# Patient Record
Sex: FEMALE | Race: WHITE | NOT HISPANIC OR LATINO | URBAN - METROPOLITAN AREA
[De-identification: names, ages, dates, MRNs, and addresses within clinical notes are randomized per-mention and may not be internally consistent; named-entity substitution may affect disease eponyms.]

---

## 2018-03-12 ENCOUNTER — OUTPATIENT (OUTPATIENT)
Dept: OUTPATIENT SERVICES | Facility: HOSPITAL | Age: 68
LOS: 1 days | Discharge: HOME | End: 2018-03-12

## 2018-03-12 DIAGNOSIS — Z12.31 ENCOUNTER FOR SCREENING MAMMOGRAM FOR MALIGNANT NEOPLASM OF BREAST: ICD-10-CM

## 2018-03-13 DIAGNOSIS — M89.9 DISORDER OF BONE, UNSPECIFIED: ICD-10-CM

## 2018-03-13 DIAGNOSIS — Z13.820 ENCOUNTER FOR SCREENING FOR OSTEOPOROSIS: ICD-10-CM

## 2018-03-13 DIAGNOSIS — Z78.0 ASYMPTOMATIC MENOPAUSAL STATE: ICD-10-CM

## 2019-03-28 ENCOUNTER — OUTPATIENT (OUTPATIENT)
Dept: OUTPATIENT SERVICES | Facility: HOSPITAL | Age: 69
LOS: 1 days | Discharge: HOME | End: 2019-03-28

## 2019-03-28 DIAGNOSIS — Z12.31 ENCOUNTER FOR SCREENING MAMMOGRAM FOR MALIGNANT NEOPLASM OF BREAST: ICD-10-CM

## 2019-05-08 ENCOUNTER — OUTPATIENT (OUTPATIENT)
Dept: OUTPATIENT SERVICES | Facility: HOSPITAL | Age: 69
LOS: 1 days | Discharge: HOME | End: 2019-05-08

## 2019-05-17 DIAGNOSIS — H40.011 OPEN ANGLE WITH BORDERLINE FINDINGS, LOW RISK, RIGHT EYE: ICD-10-CM

## 2019-05-17 DIAGNOSIS — H40.033 ANATOMICAL NARROW ANGLE, BILATERAL: ICD-10-CM

## 2019-05-17 DIAGNOSIS — H02.881 MEIBOMIAN GLAND DYSFUNCTION RIGHT UPPER EYELID: ICD-10-CM

## 2019-05-17 DIAGNOSIS — H25.13 AGE-RELATED NUCLEAR CATARACT, BILATERAL: ICD-10-CM

## 2019-05-17 DIAGNOSIS — H02.884 MEIBOMIAN GLAND DYSFUNCTION LEFT UPPER EYELID: ICD-10-CM

## 2019-05-17 DIAGNOSIS — H04.123 DRY EYE SYNDROME OF BILATERAL LACRIMAL GLANDS: ICD-10-CM

## 2019-09-09 ENCOUNTER — OUTPATIENT (OUTPATIENT)
Dept: OUTPATIENT SERVICES | Facility: HOSPITAL | Age: 69
LOS: 1 days | Discharge: HOME | End: 2019-09-09
Payer: MEDICARE

## 2019-09-09 PROCEDURE — 92083 EXTENDED VISUAL FIELD XM: CPT | Mod: 26

## 2019-09-24 ENCOUNTER — OUTPATIENT (OUTPATIENT)
Dept: OUTPATIENT SERVICES | Facility: HOSPITAL | Age: 69
LOS: 1 days | Discharge: HOME | End: 2019-09-24
Payer: MEDICARE

## 2019-09-24 PROCEDURE — 92012 INTRM OPH EXAM EST PATIENT: CPT

## 2019-09-24 PROCEDURE — 92132 CPTRZD OPH DX IMG ANT SGM: CPT | Mod: 26

## 2019-09-24 PROCEDURE — 92020 GONIOSCOPY: CPT

## 2020-10-21 ENCOUNTER — APPOINTMENT (OUTPATIENT)
Dept: CARDIOLOGY | Facility: CLINIC | Age: 70
End: 2020-10-21
Payer: MEDICARE

## 2020-10-21 VITALS
BODY MASS INDEX: 36.54 KG/M2 | DIASTOLIC BLOOD PRESSURE: 70 MMHG | HEIGHT: 64 IN | WEIGHT: 214 LBS | SYSTOLIC BLOOD PRESSURE: 128 MMHG

## 2020-10-21 DIAGNOSIS — Z78.9 OTHER SPECIFIED HEALTH STATUS: ICD-10-CM

## 2020-10-21 PROCEDURE — 99203 OFFICE O/P NEW LOW 30 MIN: CPT

## 2020-10-21 PROCEDURE — 93000 ELECTROCARDIOGRAM COMPLETE: CPT

## 2020-10-21 PROCEDURE — 99072 ADDL SUPL MATRL&STAF TM PHE: CPT

## 2020-10-21 NOTE — HISTORY OF PRESENT ILLNESS
[FreeTextEntry1] : 70-yo female with h/o hypothyroidism and hyperlipidemia. Patient has experienced episodes of sudden dyspnea for a few minutes while moving around her house, quickly relieved by rest. Patient has not been walking outside since the beginning of the pandemic. She denies CP.\par \par No prior h/o CAD. Her BP is usually normal. No cough or fever.

## 2020-10-21 NOTE — REVIEW OF SYSTEMS
[see HPI] : see HPI [Negative] : Endocrine [Blurry Vision] : no blurred vision [Seeing Double (Diplopia)] : no diplopia [Chest Pain] : no chest pain [Lower Ext Edema] : no extremity edema [Leg Claudication] : no intermittent leg claudication [Palpitations] : no palpitations [Skin: A Rash] : no rash: [Anxiety] : no anxiety

## 2020-10-21 NOTE — ASSESSMENT
[FreeTextEntry1] : 70-yo female with h/o hyperlipidemia, obesity who c/o episodes of SOB.\par \par Plan:\par Diet, weight loss discussed with patient.\par Will obtain blood work from [facility].\par 2D ECHO.\par May need a nuclear stress test.\par F/u in 2-3 weeks.\par \par David Braswell MD\par

## 2020-10-21 NOTE — PHYSICAL EXAM
[General Appearance - Well Developed] : well developed [Normal Appearance] : normal appearance [Well Groomed] : well groomed [General Appearance - Well Nourished] : well nourished [No Deformities] : no deformities [General Appearance - In No Acute Distress] : no acute distress [Normal Conjunctiva] : the conjunctiva exhibited no abnormalities [Eyelids - No Xanthelasma] : the eyelids demonstrated no xanthelasmas [Heart Rate And Rhythm] : heart rate and rhythm were normal [Heart Sounds] : normal S1 and S2 [Murmurs] : no murmurs present [Arterial Pulses Normal] : the arterial pulses were normal [Edema] : no peripheral edema present [Respiration, Rhythm And Depth] : normal respiratory rhythm and effort [Exaggerated Use Of Accessory Muscles For Inspiration] : no accessory muscle use [Auscultation Breath Sounds / Voice Sounds] : lungs were clear to auscultation bilaterally [Bowel Sounds] : normal bowel sounds [Abdomen Soft] : soft [Abdomen Tenderness] : non-tender [Abdomen Mass (___ Cm)] : no abdominal mass palpated [Cyanosis, Localized] : no localized cyanosis [Skin Color & Pigmentation] : normal skin color and pigmentation [] : no rash [Oriented To Time, Place, And Person] : oriented to person, place, and time [Affect] : the affect was normal [Mood] : the mood was normal

## 2020-12-01 ENCOUNTER — APPOINTMENT (OUTPATIENT)
Dept: CARDIOLOGY | Facility: CLINIC | Age: 70
End: 2020-12-01

## 2020-12-14 ENCOUNTER — APPOINTMENT (OUTPATIENT)
Dept: CARDIOLOGY | Facility: CLINIC | Age: 70
End: 2020-12-14
Payer: MEDICARE

## 2020-12-14 VITALS
DIASTOLIC BLOOD PRESSURE: 80 MMHG | SYSTOLIC BLOOD PRESSURE: 140 MMHG | WEIGHT: 216 LBS | BODY MASS INDEX: 38.27 KG/M2 | HEIGHT: 63 IN

## 2020-12-14 PROCEDURE — 93000 ELECTROCARDIOGRAM COMPLETE: CPT

## 2020-12-14 PROCEDURE — 99214 OFFICE O/P EST MOD 30 MIN: CPT | Mod: 25

## 2020-12-14 PROCEDURE — 99072 ADDL SUPL MATRL&STAF TM PHE: CPT

## 2020-12-14 PROCEDURE — 93306 TTE W/DOPPLER COMPLETE: CPT

## 2020-12-14 RX ORDER — ERGOCALCIFEROL 1.25 MG/1
1.25 MG CAPSULE, LIQUID FILLED ORAL
Qty: 6 | Refills: 0 | Status: ACTIVE | COMMUNITY
Start: 2020-09-23

## 2020-12-14 NOTE — HISTORY OF PRESENT ILLNESS
[FreeTextEntry1] : 70-yo female with h/o hypothyroidism and hyperlipidemia. Patient has experienced episodes of sudden dyspnea for a few minutes while moving around her house, quickly relieved by rest. Patient has not been walking outside since the beginning of the pandemic. She denies CP.\par \par No prior h/o CAD. Her BP has been about 140. She has also noticed mild ankle swelling at the end of the day.\par \par 2D ECHO 12/14/20:\par LVEF 68%, grade 1 diastolic dysfunction\par Thickened MV, mild MR.

## 2020-12-14 NOTE — REVIEW OF SYSTEMS
[Blurry Vision] : no blurred vision [Seeing Double (Diplopia)] : no diplopia [see HPI] : see HPI [Chest Pain] : no chest pain [Lower Ext Edema] : lower extremity edema [Leg Claudication] : no intermittent leg claudication [Palpitations] : no palpitations [Skin: A Rash] : no rash: [Anxiety] : no anxiety [Negative] : Endocrine

## 2020-12-14 NOTE — ASSESSMENT
[FreeTextEntry1] : 70-yo female with h/o hyperlipidemia, obesity who c/o episodes of SOB.\par HTN, intermittent edema.\par Normal LVEF, grade 1 diastolic dysfunction.\par \par Plan:\par Low-salt discussed with patient again.\par Will obtain blood work from [facility].\par Start HCTZ 12.5 mg daily.\par Nuclear stress test (Adenosine).\par F/u in 1 month.\par \par David Braswell MD\par

## 2020-12-30 ENCOUNTER — RESULT REVIEW (OUTPATIENT)
Age: 70
End: 2020-12-30

## 2020-12-30 ENCOUNTER — OUTPATIENT (OUTPATIENT)
Dept: OUTPATIENT SERVICES | Facility: HOSPITAL | Age: 70
LOS: 1 days | Discharge: HOME | End: 2020-12-30
Payer: MEDICARE

## 2020-12-30 DIAGNOSIS — R06.02 SHORTNESS OF BREATH: ICD-10-CM

## 2020-12-30 PROCEDURE — 78452 HT MUSCLE IMAGE SPECT MULT: CPT | Mod: 26

## 2021-01-15 ENCOUNTER — APPOINTMENT (OUTPATIENT)
Dept: CARDIOLOGY | Facility: CLINIC | Age: 71
End: 2021-01-15
Payer: MEDICARE

## 2021-01-15 VITALS
WEIGHT: 217 LBS | HEART RATE: 79 BPM | DIASTOLIC BLOOD PRESSURE: 70 MMHG | BODY MASS INDEX: 38.45 KG/M2 | TEMPERATURE: 98.4 F | SYSTOLIC BLOOD PRESSURE: 100 MMHG | HEIGHT: 63 IN

## 2021-01-15 DIAGNOSIS — Z00.00 ENCOUNTER FOR GENERAL ADULT MEDICAL EXAMINATION W/OUT ABNORMAL FINDINGS: ICD-10-CM

## 2021-01-15 PROCEDURE — 99072 ADDL SUPL MATRL&STAF TM PHE: CPT

## 2021-01-15 PROCEDURE — 99213 OFFICE O/P EST LOW 20 MIN: CPT

## 2021-01-15 PROCEDURE — 93000 ELECTROCARDIOGRAM COMPLETE: CPT

## 2021-01-15 RX ORDER — LEVOTHYROXINE SODIUM 75 UG/1
75 TABLET ORAL
Qty: 90 | Refills: 0 | Status: DISCONTINUED | COMMUNITY
Start: 2020-06-29 | End: 2021-01-15

## 2021-01-17 NOTE — PHYSICAL EXAM
[General Appearance - Well Developed] : well developed [Normal Appearance] : normal appearance [Well Groomed] : well groomed [General Appearance - Well Nourished] : well nourished [No Deformities] : no deformities [General Appearance - In No Acute Distress] : no acute distress [Normal Conjunctiva] : the conjunctiva exhibited no abnormalities [Eyelids - No Xanthelasma] : the eyelids demonstrated no xanthelasmas [Exaggerated Use Of Accessory Muscles For Inspiration] : no accessory muscle use [Respiration, Rhythm And Depth] : normal respiratory rhythm and effort [Auscultation Breath Sounds / Voice Sounds] : lungs were clear to auscultation bilaterally [Heart Rate And Rhythm] : heart rate and rhythm were normal [Heart Sounds] : normal S1 and S2 [Murmurs] : no murmurs present [Arterial Pulses Normal] : the arterial pulses were normal [Edema] : no peripheral edema present [Bowel Sounds] : normal bowel sounds [Abdomen Soft] : soft [Abdomen Tenderness] : non-tender [Abdomen Mass (___ Cm)] : no abdominal mass palpated [Cyanosis, Localized] : no localized cyanosis [] : no rash [Skin Color & Pigmentation] : normal skin color and pigmentation [Oriented To Time, Place, And Person] : oriented to person, place, and time [Affect] : the affect was normal [Mood] : the mood was normal

## 2021-01-17 NOTE — REVIEW OF SYSTEMS
[Blurry Vision] : no blurred vision [Seeing Double (Diplopia)] : no diplopia [see HPI] : see HPI [Chest Pain] : no chest pain [Leg Claudication] : no intermittent leg claudication [Palpitations] : no palpitations [Skin: A Rash] : no rash: [Anxiety] : no anxiety [Negative] : Endocrine

## 2021-01-17 NOTE — ASSESSMENT
[FreeTextEntry1] : 70-yo female with h/o HTN, hyperlipidemia, obesity.\par No evidence of ischemia.\par \par Plan:\par Diet, weight loss discussed with patient again.\par Will obtain blood work from [facility].\par F/u in 2 months.\par \par David Braswell MD\par

## 2021-01-17 NOTE — HISTORY OF PRESENT ILLNESS
[FreeTextEntry1] : 70-yo female with h/o hypothyroidism and hyperlipidemia. Patient has experienced episodes of sudden dyspnea for a few minutes while moving around her house, quickly relieved by rest. Patient has not been walking outside since the beginning of the pandemic. She denies CP.\par \par Her BP and leg edema have improved with HCTZ.\par \par 2D ECHO 12/14/20:\par LVEF 68%, grade 1 diastolic dysfunction\par Thickened MV, mild MR.

## 2021-03-19 ENCOUNTER — RX RENEWAL (OUTPATIENT)
Age: 71
End: 2021-03-19

## 2021-04-01 ENCOUNTER — APPOINTMENT (OUTPATIENT)
Dept: CARDIOLOGY | Facility: CLINIC | Age: 71
End: 2021-04-01
Payer: MEDICARE

## 2021-04-01 VITALS
BODY MASS INDEX: 37.56 KG/M2 | HEIGHT: 63 IN | WEIGHT: 212 LBS | SYSTOLIC BLOOD PRESSURE: 100 MMHG | DIASTOLIC BLOOD PRESSURE: 70 MMHG | HEART RATE: 78 BPM | TEMPERATURE: 98 F

## 2021-04-01 PROCEDURE — 93000 ELECTROCARDIOGRAM COMPLETE: CPT

## 2021-04-01 PROCEDURE — 99072 ADDL SUPL MATRL&STAF TM PHE: CPT

## 2021-04-01 PROCEDURE — 99213 OFFICE O/P EST LOW 20 MIN: CPT

## 2021-04-01 NOTE — PHYSICAL EXAM
[General Appearance - Well Developed] : well developed [Normal Appearance] : normal appearance [Well Groomed] : well groomed [General Appearance - Well Nourished] : well nourished [No Deformities] : no deformities [General Appearance - In No Acute Distress] : no acute distress [Normal Conjunctiva] : the conjunctiva exhibited no abnormalities [Eyelids - No Xanthelasma] : the eyelids demonstrated no xanthelasmas [Respiration, Rhythm And Depth] : normal respiratory rhythm and effort [Exaggerated Use Of Accessory Muscles For Inspiration] : no accessory muscle use [Auscultation Breath Sounds / Voice Sounds] : lungs were clear to auscultation bilaterally [Heart Rate And Rhythm] : heart rate and rhythm were normal [Heart Sounds] : normal S1 and S2 [Murmurs] : no murmurs present [Arterial Pulses Normal] : the arterial pulses were normal [Bowel Sounds] : normal bowel sounds [Abdomen Soft] : soft [Abdomen Tenderness] : non-tender [Abdomen Mass (___ Cm)] : no abdominal mass palpated [Cyanosis, Localized] : no localized cyanosis [Skin Color & Pigmentation] : normal skin color and pigmentation [] : no rash [Oriented To Time, Place, And Person] : oriented to person, place, and time [Affect] : the affect was normal [Mood] : the mood was normal [FreeTextEntry1] : mild ankle edema

## 2021-04-01 NOTE — ASSESSMENT
[FreeTextEntry1] : 70-yo female with h/o HTN, hyperlipidemia, obesity.\par No evidence of ischemia.\par \par Plan:\par Diet, weight loss discussed with patient again.\par Repeat BW prior to next visit.\par F/u in 6 months.\par \par David Braswell MD\par

## 2021-04-01 NOTE — HISTORY OF PRESENT ILLNESS
[FreeTextEntry1] : 70-yo female with h/o HTN, hypothyroidism and hyperlipidemia. Patient still c/o mild NARAYAN. She denies CP. She can walk several blocks.\par \par Her BP and leg edema have improved with HCTZ.\par \par 2D ECHO 12/14/20:\par LVEF 68%, grade 1 diastolic dysfunction\par Thickened MV, mild MR.\par \par GFR 97\par Hb A1c 5.6\par LDL\par HDL 5358\par Triglycerides 75\par TSH 2.5.\par

## 2021-04-01 NOTE — REVIEW OF SYSTEMS
[see HPI] : see HPI [Negative] : Endocrine [Blurry Vision] : no blurred vision [Seeing Double (Diplopia)] : no diplopia [Chest Pain] : no chest pain [Leg Claudication] : no intermittent leg claudication [Palpitations] : no palpitations [Skin: A Rash] : no rash: [Anxiety] : no anxiety

## 2021-08-13 NOTE — PHYSICAL EXAM
[General Appearance - Well Developed] : well developed [Normal Appearance] : normal appearance [Well Groomed] : well groomed [General Appearance - Well Nourished] : well nourished [No Deformities] : no deformities [General Appearance - In No Acute Distress] : no acute distress [Normal Conjunctiva] : the conjunctiva exhibited no abnormalities [Eyelids - No Xanthelasma] : the eyelids demonstrated no xanthelasmas [Respiration, Rhythm And Depth] : normal respiratory rhythm and effort weakness/hemiparesis [Exaggerated Use Of Accessory Muscles For Inspiration] : no accessory muscle use [Auscultation Breath Sounds / Voice Sounds] : lungs were clear to auscultation bilaterally [Heart Rate And Rhythm] : heart rate and rhythm were normal [Heart Sounds] : normal S1 and S2 [Murmurs] : no murmurs present [Arterial Pulses Normal] : the arterial pulses were normal [Edema] : no peripheral edema present [Bowel Sounds] : normal bowel sounds [Abdomen Soft] : soft [Abdomen Tenderness] : non-tender [Abdomen Mass (___ Cm)] : no abdominal mass palpated [Cyanosis, Localized] : no localized cyanosis [Skin Color & Pigmentation] : normal skin color and pigmentation [] : no rash [Oriented To Time, Place, And Person] : oriented to person, place, and time [Affect] : the affect was normal [Mood] : the mood was normal

## 2021-10-07 ENCOUNTER — OUTPATIENT (OUTPATIENT)
Dept: OUTPATIENT SERVICES | Facility: HOSPITAL | Age: 71
LOS: 1 days | Discharge: HOME | End: 2021-10-07
Payer: MEDICARE

## 2021-10-07 DIAGNOSIS — Z12.31 ENCOUNTER FOR SCREENING MAMMOGRAM FOR MALIGNANT NEOPLASM OF BREAST: ICD-10-CM

## 2021-10-07 PROCEDURE — 77067 SCR MAMMO BI INCL CAD: CPT | Mod: 26

## 2021-10-07 PROCEDURE — 77063 BREAST TOMOSYNTHESIS BI: CPT | Mod: 26

## 2021-10-11 DIAGNOSIS — Z13.820 ENCOUNTER FOR SCREENING FOR OSTEOPOROSIS: ICD-10-CM

## 2021-10-11 DIAGNOSIS — Z78.0 ASYMPTOMATIC MENOPAUSAL STATE: ICD-10-CM

## 2021-10-11 DIAGNOSIS — M89.9 DISORDER OF BONE, UNSPECIFIED: ICD-10-CM

## 2021-11-04 ENCOUNTER — APPOINTMENT (OUTPATIENT)
Dept: CARDIOLOGY | Facility: CLINIC | Age: 71
End: 2021-11-04
Payer: MEDICARE

## 2021-11-04 VITALS
SYSTOLIC BLOOD PRESSURE: 135 MMHG | WEIGHT: 215 LBS | TEMPERATURE: 98 F | DIASTOLIC BLOOD PRESSURE: 70 MMHG | BODY MASS INDEX: 38.09 KG/M2 | HEIGHT: 63 IN

## 2021-11-04 DIAGNOSIS — R06.02 SHORTNESS OF BREATH: ICD-10-CM

## 2021-11-04 PROCEDURE — 93000 ELECTROCARDIOGRAM COMPLETE: CPT

## 2021-11-04 PROCEDURE — 99213 OFFICE O/P EST LOW 20 MIN: CPT

## 2021-11-04 NOTE — HISTORY OF PRESENT ILLNESS
[FreeTextEntry1] : 70-yo female with h/o HTN, hypothyroidism and hyperlipidemia. Patient still c/o mild NARAYAN. She denies CP. She can walk several blocks.\par \par Her BP and leg edema have improved with HCTZ.\par \par 2D ECHO 12/14/20:\par LVEF 68%, grade 1 diastolic dysfunction\par Thickened MV, mild MR.\par \par GFR 82\par Hb A1c 5.6\par LDL 65\par HDL 54\par Triglycerides 78\par TSH 2.38.\par

## 2021-11-04 NOTE — ASSESSMENT
[FreeTextEntry1] : 70-yo female with h/o HTN, hyperlipidemia, obesity.\par No evidence of ischemia.\par Benign positional vertigo.\par \par Plan:\par Diet, weight loss discussed with patient again.\par Repeat BW prior to next visit.\par F/u in 6 months.\par \par David Braswell MD\par

## 2021-11-04 NOTE — PHYSICAL EXAM
[Well Developed] : well developed [No Acute Distress] : no acute distress [Obese] : obese [Normal Conjunctiva] : normal conjunctiva [Normal Venous Pressure] : normal venous pressure [No Carotid Bruit] : no carotid bruit [Normal S1, S2] : normal S1, S2 [No Murmur] : no murmur [No Rub] : no rub [S4] : S4 [Clear Lung Fields] : clear lung fields [Good Air Entry] : good air entry [No Respiratory Distress] : no respiratory distress  [Soft] : abdomen soft [Non Tender] : non-tender [Normal Bowel Sounds] : normal bowel sounds [Normal Gait] : normal gait [No Edema] : no edema [No Cyanosis] : no cyanosis [No Clubbing] : no clubbing [Venous varicosities] : venous varicosities [No Rash] : no rash [Moves all extremities] : moves all extremities [No Focal Deficits] : no focal deficits [Normal Speech] : normal speech [Alert and Oriented] : alert and oriented [Normal memory] : normal memory

## 2021-11-04 NOTE — REVIEW OF SYSTEMS
[Dyspnea on exertion] : not dyspnea during exertion [Lower Ext Edema] : no extremity edema [Palpitations] : no palpitations [Orthopnea] : no orthopnea [Syncope] : no syncope [Rash] : no rash [Dizziness] : dizziness [Anxiety] : no anxiety [Negative] : Gastrointestinal

## 2021-11-05 ENCOUNTER — RESULT CHARGE (OUTPATIENT)
Age: 71
End: 2021-11-05

## 2021-11-10 ENCOUNTER — APPOINTMENT (OUTPATIENT)
Dept: CARDIOLOGY | Facility: CLINIC | Age: 71
End: 2021-11-10

## 2022-07-19 ENCOUNTER — RX RENEWAL (OUTPATIENT)
Age: 72
End: 2022-07-19

## 2022-11-16 ENCOUNTER — OUTPATIENT (OUTPATIENT)
Dept: OUTPATIENT SERVICES | Facility: HOSPITAL | Age: 72
LOS: 1 days | Discharge: HOME | End: 2022-11-16

## 2022-11-16 DIAGNOSIS — Z12.31 ENCOUNTER FOR SCREENING MAMMOGRAM FOR MALIGNANT NEOPLASM OF BREAST: ICD-10-CM

## 2022-11-16 PROCEDURE — 77067 SCR MAMMO BI INCL CAD: CPT | Mod: 26

## 2022-11-16 PROCEDURE — 77063 BREAST TOMOSYNTHESIS BI: CPT | Mod: 26

## 2022-11-17 ENCOUNTER — APPOINTMENT (OUTPATIENT)
Dept: CARDIOLOGY | Facility: CLINIC | Age: 72
End: 2022-11-17

## 2022-11-17 ENCOUNTER — RESULT CHARGE (OUTPATIENT)
Age: 72
End: 2022-11-17

## 2022-11-17 VITALS
HEIGHT: 63 IN | DIASTOLIC BLOOD PRESSURE: 72 MMHG | SYSTOLIC BLOOD PRESSURE: 128 MMHG | WEIGHT: 205 LBS | BODY MASS INDEX: 36.32 KG/M2 | HEART RATE: 68 BPM

## 2022-11-17 PROCEDURE — 99213 OFFICE O/P EST LOW 20 MIN: CPT | Mod: 25

## 2022-11-17 PROCEDURE — 93000 ELECTROCARDIOGRAM COMPLETE: CPT

## 2022-11-17 RX ORDER — CHLORHEXIDINE GLUCONATE, 0.12% ORAL RINSE 1.2 MG/ML
0.12 SOLUTION DENTAL
Qty: 473 | Refills: 0 | Status: DISCONTINUED | COMMUNITY
Start: 2022-09-19

## 2022-11-17 RX ORDER — NORTRIPTYLINE HYDROCHLORIDE 10 MG/1
10 CAPSULE ORAL
Qty: 60 | Refills: 0 | Status: DISCONTINUED | COMMUNITY
Start: 2022-06-16

## 2022-11-17 RX ORDER — FLUTICASONE PROPIONATE 50 UG/1
50 SPRAY, METERED NASAL
Qty: 16 | Refills: 0 | Status: DISCONTINUED | COMMUNITY
Start: 2022-10-19

## 2022-11-17 RX ORDER — AMOXICILLIN 500 MG/1
500 CAPSULE ORAL
Qty: 30 | Refills: 0 | Status: DISCONTINUED | COMMUNITY
Start: 2022-09-19

## 2022-11-17 RX ORDER — BUTALBITAL, ACETAMINOPHEN AND CAFFEINE 300; 50; 40 MG/1; MG/1; MG/1
50-300-40 CAPSULE ORAL
Qty: 20 | Refills: 0 | Status: DISCONTINUED | COMMUNITY
Start: 2022-05-24

## 2022-11-17 NOTE — HISTORY OF PRESENT ILLNESS
[FreeTextEntry1] : 70-yo female with h/o HTN, hypothyroidism and hyperlipidemia. \par \par Patient states her NARAYAN has improved, she is able to walk several blocks without stopping. She denies CP. \par Had COVID this past summer. \par \par Took a medication from [place] for vertigo which helped significantly\par \par BP is controlled. \par \par 2D ECHO 12/14/20:\par LVEF 68%, grade 1 diastolic dysfunction\par Thickened MV, mild MR.\par \par

## 2022-11-17 NOTE — REVIEW OF SYSTEMS
[Dizziness] : dizziness [Negative] : Gastrointestinal [SOB] : no shortness of breath [Dyspnea on exertion] : not dyspnea during exertion [Chest Discomfort] : no chest discomfort [Lower Ext Edema] : no extremity edema [Leg Claudication] : no intermittent leg claudication [Palpitations] : no palpitations [Orthopnea] : no orthopnea [Syncope] : no syncope [Rash] : no rash [Anxiety] : no anxiety

## 2023-05-11 ENCOUNTER — APPOINTMENT (OUTPATIENT)
Dept: CARDIOLOGY | Facility: CLINIC | Age: 73
End: 2023-05-11
Payer: MEDICARE

## 2023-05-11 ENCOUNTER — RESULT CHARGE (OUTPATIENT)
Age: 73
End: 2023-05-11

## 2023-05-11 VITALS
WEIGHT: 208 LBS | HEART RATE: 70 BPM | DIASTOLIC BLOOD PRESSURE: 60 MMHG | SYSTOLIC BLOOD PRESSURE: 130 MMHG | BODY MASS INDEX: 36.86 KG/M2 | HEIGHT: 63 IN

## 2023-05-11 DIAGNOSIS — R60.9 EDEMA, UNSPECIFIED: ICD-10-CM

## 2023-05-11 DIAGNOSIS — E78.5 HYPERLIPIDEMIA, UNSPECIFIED: ICD-10-CM

## 2023-05-11 DIAGNOSIS — I10 ESSENTIAL (PRIMARY) HYPERTENSION: ICD-10-CM

## 2023-05-11 PROCEDURE — 93000 ELECTROCARDIOGRAM COMPLETE: CPT

## 2023-05-11 PROCEDURE — 99213 OFFICE O/P EST LOW 20 MIN: CPT | Mod: 25

## 2023-05-11 NOTE — ASSESSMENT
[FreeTextEntry1] : 72-yo female with h/o HTN, hyperlipidemia, obesity.\par No evidence of ischemia.\par Leg edema has improved.\par \par \par Plan:\par Diet, weight loss discussed with patient again.\par Continue treatment.\par F/u in 1 year.\par \par David Braswell MD\par

## 2023-05-11 NOTE — HISTORY OF PRESENT ILLNESS
[FreeTextEntry1] : 73 y/o female with h/o HTN, hypothyroidism and hyperlipidemia. \par \par Patient states her NARAYAN has improved, she is able to walk several blocks without stopping. She denies CP. \par Had COVID this past summer.\par \par BP is controlled. \par \par 2D ECHO 12/14/20:\par LVEF 68%, grade 1 diastolic dysfunction\par Thickened MV, mild MR.\par \par LDL 65\par A1C 5.6.\par

## 2023-11-21 ENCOUNTER — OUTPATIENT (OUTPATIENT)
Dept: OUTPATIENT SERVICES | Facility: HOSPITAL | Age: 73
LOS: 1 days | End: 2023-11-21
Payer: MEDICARE

## 2023-11-21 DIAGNOSIS — Z13.820 ENCOUNTER FOR SCREENING FOR OSTEOPOROSIS: ICD-10-CM

## 2023-11-21 DIAGNOSIS — Z12.31 ENCOUNTER FOR SCREENING MAMMOGRAM FOR MALIGNANT NEOPLASM OF BREAST: ICD-10-CM

## 2023-11-21 PROCEDURE — 77063 BREAST TOMOSYNTHESIS BI: CPT | Mod: 26

## 2023-11-21 PROCEDURE — 77067 SCR MAMMO BI INCL CAD: CPT | Mod: 26

## 2023-11-21 PROCEDURE — 77080 DXA BONE DENSITY AXIAL: CPT

## 2023-11-21 PROCEDURE — 77067 SCR MAMMO BI INCL CAD: CPT

## 2023-11-21 PROCEDURE — 77063 BREAST TOMOSYNTHESIS BI: CPT

## 2023-11-22 DIAGNOSIS — Z13.820 ENCOUNTER FOR SCREENING FOR OSTEOPOROSIS: ICD-10-CM

## 2023-11-22 DIAGNOSIS — Z12.31 ENCOUNTER FOR SCREENING MAMMOGRAM FOR MALIGNANT NEOPLASM OF BREAST: ICD-10-CM

## 2024-03-28 RX ORDER — ROSUVASTATIN CALCIUM 5 MG/1
5 TABLET, FILM COATED ORAL
Qty: 90 | Refills: 1 | Status: ACTIVE | COMMUNITY
Start: 1900-01-01 | End: 1900-01-01

## 2024-03-28 RX ORDER — HYDROCHLOROTHIAZIDE 12.5 MG/1
12.5 TABLET ORAL
Qty: 90 | Refills: 1 | Status: ACTIVE | COMMUNITY
Start: 2020-12-14 | End: 1900-01-01

## 2024-09-12 ENCOUNTER — APPOINTMENT (OUTPATIENT)
Dept: CARDIOLOGY | Facility: CLINIC | Age: 74
End: 2024-09-12
Payer: MEDICARE

## 2024-09-12 VITALS
DIASTOLIC BLOOD PRESSURE: 60 MMHG | WEIGHT: 213 LBS | HEART RATE: 81 BPM | SYSTOLIC BLOOD PRESSURE: 118 MMHG | BODY MASS INDEX: 37.74 KG/M2 | HEIGHT: 63 IN

## 2024-09-12 DIAGNOSIS — E78.5 HYPERLIPIDEMIA, UNSPECIFIED: ICD-10-CM

## 2024-09-12 DIAGNOSIS — I10 ESSENTIAL (PRIMARY) HYPERTENSION: ICD-10-CM

## 2024-09-12 DIAGNOSIS — R60.9 EDEMA, UNSPECIFIED: ICD-10-CM

## 2024-09-12 PROCEDURE — 93000 ELECTROCARDIOGRAM COMPLETE: CPT

## 2024-09-12 PROCEDURE — 99214 OFFICE O/P EST MOD 30 MIN: CPT | Mod: 25

## 2024-09-12 NOTE — HISTORY OF PRESENT ILLNESS
[FreeTextEntry1] : 73 y/o female with h/o HTN, hypothyroidism and hyperlipidemia.   Patient states her NARAYAN has improved, she is able to walk several blocks without stopping. She denies CP.  Had COVID this past summer.  BP is controlled. Several episodes of HR > 100 while checking BP. Patient denies palpitations.  2D ECHO 12/14/20: LVEF 68%, grade 1 diastolic dysfunction Thickened MV, mild MR.  LDL 65 A1C 5.6.

## 2024-09-12 NOTE — ASSESSMENT
[FreeTextEntry1] : 74-yo female with h/o HTN, hyperlipidemia, obesity. No evidence of ischemia. Leg edema has improved. Occasional episodes of elevated HR. Asymptomatic.  Plan: Diet, weight loss discussed with patient again. Continue HCTZ. Continue Rosuvastatin 5 mg daily. Repeat BW pending. Will need [facility]OT if elevated HR becomes more frequent. F/u in 6 months.  David Braswell MD

## 2024-12-12 ENCOUNTER — APPOINTMENT (OUTPATIENT)
Dept: ORTHOPEDIC SURGERY | Facility: CLINIC | Age: 74
End: 2024-12-12
Payer: MEDICARE

## 2024-12-12 DIAGNOSIS — M25.512 PAIN IN LEFT SHOULDER: ICD-10-CM

## 2024-12-12 PROCEDURE — 99203 OFFICE O/P NEW LOW 30 MIN: CPT

## 2024-12-12 PROCEDURE — 73030 X-RAY EXAM OF SHOULDER: CPT | Mod: LT

## 2024-12-12 NOTE — HISTORY OF PRESENT ILLNESS
[de-identified] : Patient is here for evaluation of left shoulder pain  having more pain to deltoid/triceps  NAD Left shoulder: TTP deltois, no shoulder jt ttp FF 0-175 ER 60 IR T12 5/5 strength scapular abduction, ER, IR Negative instability  XRay left shoulder negative for fracture, dislocation, arthritis  Plan went over findings explained muscle strain will start pt if no improvement will get an mri

## 2025-01-29 ENCOUNTER — NON-APPOINTMENT (OUTPATIENT)
Age: 75
End: 2025-01-29

## 2025-03-20 ENCOUNTER — APPOINTMENT (OUTPATIENT)
Dept: CARDIOLOGY | Facility: CLINIC | Age: 75
End: 2025-03-20
Payer: MEDICARE

## 2025-03-20 VITALS
HEART RATE: 78 BPM | BODY MASS INDEX: 37.92 KG/M2 | HEIGHT: 63 IN | DIASTOLIC BLOOD PRESSURE: 60 MMHG | SYSTOLIC BLOOD PRESSURE: 100 MMHG | WEIGHT: 214 LBS

## 2025-03-20 DIAGNOSIS — I10 ESSENTIAL (PRIMARY) HYPERTENSION: ICD-10-CM

## 2025-03-20 DIAGNOSIS — E11.9 TYPE 2 DIABETES MELLITUS W/OUT COMPLICATIONS: ICD-10-CM

## 2025-03-20 DIAGNOSIS — R06.02 SHORTNESS OF BREATH: ICD-10-CM

## 2025-03-20 DIAGNOSIS — R60.9 EDEMA, UNSPECIFIED: ICD-10-CM

## 2025-03-20 DIAGNOSIS — E78.5 HYPERLIPIDEMIA, UNSPECIFIED: ICD-10-CM

## 2025-03-20 PROCEDURE — 93000 ELECTROCARDIOGRAM COMPLETE: CPT

## 2025-03-20 PROCEDURE — 99214 OFFICE O/P EST MOD 30 MIN: CPT | Mod: 25

## 2025-03-20 RX ORDER — EMPAGLIFLOZIN 10 MG/1
10 TABLET, FILM COATED ORAL DAILY
Qty: 90 | Refills: 2 | Status: ACTIVE | COMMUNITY
Start: 2025-03-20

## 2025-03-20 NOTE — HISTORY OF PRESENT ILLNESS
[FreeTextEntry1] : 75 y/o female with h/o HTN, hypothyroidism and hyperlipidemia.   Patient states her AGUILAR has improved, she is able to walk several blocks without stopping. She denies CP.  Had COVID this past summer.  BP is controlled.  No more  episodes of HR > 100 while checking BP. Patient denies palpitations. Reports B/p 140th in am x 1 weeks.   B/p today 100/60   2D ECHO 12/14/20: LVEF 68%, grade 1 diastolic dysfunction Thickened MV, mild MR.    LDL 72 on ros 5mg  A1C 6.3  was started on Jardiance but wishes to start diet first  TSH 1.51

## 2025-03-20 NOTE — HISTORY OF PRESENT ILLNESS
[FreeTextEntry1] : 73 y/o female with h/o HTN, hypothyroidism and hyperlipidemia.   Patient states her AGUILAR has improved, she is able to walk several blocks without stopping. She denies CP.  Had COVID this past summer.  BP is controlled.  No more  episodes of HR > 100 while checking BP. Patient denies palpitations. Reports B/p 140th in am x 1 weeks.   B/p today 100/60   2D ECHO 12/14/20: LVEF 68%, grade 1 diastolic dysfunction Thickened MV, mild MR.    LDL 72 on ros 5mg  A1C 6.3  was started on Jardiance but wishes to start diet first  TSH 1.51

## 2025-03-20 NOTE — ASSESSMENT
[FreeTextEntry1] : 74-yo female with h/o HTN, hyperlipidemia, obesity. No evidence of ischemia. Leg edema has improved. Occasional episodes of elevated HR. Asymptomatic.  Plan: Diet, weight loss discussed with patient again. Continue HCTZ. Continue Rosuvastatin 5 mg daily. Repeat BW prior to next visit. Will need MCOT if elevated HR becomes more frequent. F/u in 6 months.  Quang Lovelace MD